# Patient Record
Sex: FEMALE | Race: WHITE | NOT HISPANIC OR LATINO | Employment: FULL TIME | URBAN - METROPOLITAN AREA
[De-identification: names, ages, dates, MRNs, and addresses within clinical notes are randomized per-mention and may not be internally consistent; named-entity substitution may affect disease eponyms.]

---

## 2018-12-17 ENCOUNTER — RECORDS - HEALTHEAST (OUTPATIENT)
Dept: LAB | Facility: HOSPITAL | Age: 28
End: 2018-12-17

## 2018-12-17 LAB
ANION GAP SERPL CALCULATED.3IONS-SCNC: 8 MMOL/L (ref 5–18)
BUN SERPL-MCNC: 14 MG/DL (ref 8–22)
CALCIUM SERPL-MCNC: 9.1 MG/DL (ref 8.5–10.5)
CHLORIDE BLD-SCNC: 112 MMOL/L (ref 98–107)
CK SERPL-CCNC: 35 U/L (ref 30–190)
CO2 SERPL-SCNC: 19 MMOL/L (ref 22–31)
CREAT SERPL-MCNC: 0.85 MG/DL (ref 0.6–1.1)
GFR SERPL CREATININE-BSD FRML MDRD: >60 ML/MIN/1.73M2
GLUCOSE BLD-MCNC: 83 MG/DL (ref 70–125)
POTASSIUM BLD-SCNC: 4.1 MMOL/L (ref 3.5–5)
SODIUM SERPL-SCNC: 139 MMOL/L (ref 136–145)

## 2018-12-20 ENCOUNTER — COMMUNICATION - HEALTHEAST (OUTPATIENT)
Dept: NEUROSURGERY | Facility: CLINIC | Age: 28
End: 2018-12-20

## 2019-01-09 ENCOUNTER — RECORDS - HEALTHEAST (OUTPATIENT)
Dept: ADMINISTRATIVE | Facility: OTHER | Age: 29
End: 2019-01-09

## 2019-01-10 ENCOUNTER — OFFICE VISIT - HEALTHEAST (OUTPATIENT)
Dept: NEUROSURGERY | Facility: CLINIC | Age: 29
End: 2019-01-10

## 2019-01-10 DIAGNOSIS — G93.5 CHIARI I MALFORMATION (H): ICD-10-CM

## 2019-01-10 RX ORDER — TOPIRAMATE 25 MG/1
75 TABLET, FILM COATED ORAL
Status: SHIPPED | COMMUNITY
Start: 2018-09-21

## 2019-01-10 RX ORDER — LORAZEPAM 0.5 MG/1
0.5 TABLET ORAL
Status: SHIPPED | COMMUNITY
Start: 2018-09-21

## 2019-01-10 ASSESSMENT — MIFFLIN-ST. JEOR: SCORE: 1614.46

## 2019-01-14 ENCOUNTER — RECORDS - HEALTHEAST (OUTPATIENT)
Dept: RADIOLOGY | Facility: CLINIC | Age: 29
End: 2019-01-14

## 2021-02-08 ENCOUNTER — OFFICE VISIT - HEALTHEAST (OUTPATIENT)
Dept: CARDIOLOGY | Facility: CLINIC | Age: 31
End: 2021-02-08

## 2021-02-08 DIAGNOSIS — Z00.6 EXAMINATION OF PARTICIPANT OR CONTROL IN CLINICAL RESEARCH: ICD-10-CM

## 2021-02-08 RX ORDER — NAPROXEN SODIUM 220 MG
220 TABLET ORAL PRN
Status: SHIPPED | COMMUNITY
Start: 2021-02-08

## 2021-02-08 RX ORDER — DULOXETIN HYDROCHLORIDE 60 MG/1
60 CAPSULE, DELAYED RELEASE ORAL DAILY
Status: SHIPPED | COMMUNITY
Start: 2021-02-08

## 2021-02-09 ENCOUNTER — AMBULATORY - HEALTHEAST (OUTPATIENT)
Dept: CARDIOLOGY | Facility: CLINIC | Age: 31
End: 2021-02-09

## 2021-02-09 DIAGNOSIS — Z00.6 EXAMINATION OF PARTICIPANT IN CLINICAL TRIAL: ICD-10-CM

## 2021-02-09 LAB — HCG UR QL: NEGATIVE

## 2021-02-09 ASSESSMENT — MIFFLIN-ST. JEOR: SCORE: 1659.82

## 2021-03-02 ENCOUNTER — AMBULATORY - HEALTHEAST (OUTPATIENT)
Dept: CARDIOLOGY | Facility: CLINIC | Age: 31
End: 2021-03-02

## 2021-03-02 DIAGNOSIS — Z00.6 EXAMINATION OF PARTICIPANT OR CONTROL IN CLINICAL RESEARCH: ICD-10-CM

## 2021-03-02 LAB — HCG UR QL: NEGATIVE

## 2021-03-16 ENCOUNTER — AMBULATORY - HEALTHEAST (OUTPATIENT)
Dept: CARDIOLOGY | Facility: CLINIC | Age: 31
End: 2021-03-16

## 2021-03-16 DIAGNOSIS — Z00.6 EXAMINATION OF PARTICIPANT IN CLINICAL TRIAL: ICD-10-CM

## 2021-05-27 VITALS
HEART RATE: 99 BPM | RESPIRATION RATE: 18 BRPM | OXYGEN SATURATION: 96 % | DIASTOLIC BLOOD PRESSURE: 77 MMHG | TEMPERATURE: 98.1 F | SYSTOLIC BLOOD PRESSURE: 116 MMHG

## 2021-05-27 VITALS
SYSTOLIC BLOOD PRESSURE: 129 MMHG | HEART RATE: 94 BPM | RESPIRATION RATE: 18 BRPM | TEMPERATURE: 97.8 F | OXYGEN SATURATION: 98 % | DIASTOLIC BLOOD PRESSURE: 74 MMHG

## 2021-06-02 VITALS — HEIGHT: 67 IN | WEIGHT: 190 LBS | BODY MASS INDEX: 29.82 KG/M2

## 2021-06-05 VITALS
HEIGHT: 67 IN | DIASTOLIC BLOOD PRESSURE: 67 MMHG | HEART RATE: 103 BPM | BODY MASS INDEX: 31.39 KG/M2 | TEMPERATURE: 97.9 F | RESPIRATION RATE: 14 BRPM | WEIGHT: 200 LBS | SYSTOLIC BLOOD PRESSURE: 112 MMHG | OXYGEN SATURATION: 97 %

## 2021-06-18 NOTE — PATIENT INSTRUCTIONS - HE
Patient Instructions by Sera Prater RN at 3/2/2021  8:00 AM     Author: Sera Prater RN Service: -- Author Type: Registered Nurse    Filed: 3/2/2021  8:32 AM Encounter Date: 3/2/2021 Status: Signed    : Sera Prater RN (Registered Nurse)           PREVENT-19 Day 21  Thank you for coming in today for your 2nd injection.  Please continue to add your temperature daily in the kenneth.  If you have symptoms, please wait to start the nasal swab collections until you have been contacted by the study team and instructed to do so.    Your next visit is Day 35. We have scheduled this for 3/16/2021 at 1030am.  If you have questions, you may call the [facility] Research department at [phone]    Best regards,  Sera Prater RN

## 2021-06-18 NOTE — PATIENT INSTRUCTIONS - HE
Patient Instructions by Shola Lewis at 2/9/2021  8:00 AM     Author: Shola Lewis Service: -- Author Type: Research Associate    Filed: 2/9/2021  9:18 AM Encounter Date: 2/9/2021 Status: Signed    : Shola Lewis (Research Associate)         Thank you for coming in today and agreeing to participate in the PREVENT-19 COVID-19 Vaccine Clinical Trial     Your next appointment is in 21 days. We have scheduled that for 3/2/21 at 8:00AM.  You are going home with the following items    A ruler to measure any injection site reaction that might happen. This includes areas of redness, swelling, or warmth    A kit for collection of nasal swabs for you to use as directed  o Instructions on how to collect the nasal swab    Participant Guidance Document    Folder with your copy of the consent form with copies of the signature pages    A thermometer and instructions for taking your temperature    [employer]in[employer] (payment debit card): As stated within the consent form, you will receive these funds within 30 days of your visit.  While we will work to have the funds available as soon as possible, you should expect it will take a minimum of 2 weeks.  If you do not have funds loaded by your Day 21 visit, please let us know.  If you have issues with the card, please call us at [phone].      Best regards,  Shola Lewis. Research Associate

## 2021-06-18 NOTE — LETTER
Letter by Nila Rivera MD at      Author: Nila Rivera MD Service: -- Author Type: --    Filed:  Encounter Date: 1/10/2019 Status: (Other)       Felipe Moise MD  [address] [facility] [address]                                  January 10, 2019    Patient: Miesha Logan   MR Number: [id number]   YOB: 1990   Date of Visit: 1/10/2019     Dear Dr. Suma MD:    Thank you for referring Miesha Logan to me for evaluation. Below are the relevant portions of my assessment and plan of care.    If you have questions, please do not hesitate to call me. I look forward to following Miesha along with you.    Sincerely,        Nila Rivera MD          CC  MARY Bailey Mary E, MD  1/10/2019  8:19 AM  Sign at close encounter  NEUROSURGERY CONSULTATION NOTE:    Assessment:    1. Chiari I malformation (H)         Plan:  I will get a CINE flow study and see her back.    Miesha Logan   [address] [facility] [address]  28 y.o. female is sent to me in consultation   by Mckayla Vargas NP     CC:    Chief Complaint   Patient presents with   ? Neck Pain       HPI:  Neurosurgery consultation was requested by: Dr. Felipe VARGAS since she was a young child   Pain: Neck pain   Radicular Pain is present: Will radiate into left shoulder but not always   Lhermitte sign: No  Motor complaints: Generally weak in both arms   Sensory complaints: Numbness in both pinkys and will go to 3rd finer on both hands.   Tingling in right foot twice. And right arm goes numb when sleeping   Gait and balance issues: Yes  Bowel or bladder issues: Denies   Duration of SX is: Chronic HA and neck pain since 11/2018  The symptoms are worse with: Laying down is worse for HA,   The symptoms are better with: Certain positions   Injury: Denies   Severity is:Chronic   Patient has tried the following conservative measures: None       PROBLEM LIST:  1. Chiari I malformation (H)           REVIEW OF SYSTEMS:  A 12 point review of system is  positive for some weight changes.  She does experience some blurred or double vision and ringing in the ears.  She has some difficulty hearing which prompted the MRI scan of the brain.  She does experience abdominal pain.  She can experience anxiety.  She has the neurological symptoms listed above.  She has some intolerance to hot and cold.  She also has some personal history of arthritis.  Otherwise, the review is negative.      Past Medical History:   Diagnosis Date   ? Anxiety    ? Asthma    ? Headache    ? Pyriformis syndrome          Past Surgical History:   Procedure Laterality Date   ? cyst removed from jaw           MEDICATIONS:  Current Outpatient Medications   Medication Sig Dispense Refill   ? levonorgestrel (KYLEENA) 17.5 mcg/24 hr (5 years) IUD IUD 1 Device by Intrauterine route.     ? LORazepam (ATIVAN) 0.5 MG tablet Take 0.5 mg by mouth.     ? sertraline (ZOLOFT) 50 MG tablet Take 50 mg by mouth.     ? topiramate (TOPAMAX) 25 MG tablet Take 75 mg by mouth.       No current facility-administered medications for this visit.          ALLERGIES/SENSITIVITIES:     Allergies   Allergen Reactions   ? Penicillins        PERTINENT SOCIAL HISTORY:   Social History     Socioeconomic History   ? Marital status: Single     Spouse name: None   ? Number of children: None   ? Years of education: None   ? Highest education level: None   Social Needs   ? Financial resource strain: None   ? Food insecurity - worry: None   ? Food insecurity - inability: None   ? Transportation needs - medical: None   ? Transportation needs - non-medical: None   Occupational History   ? None   Tobacco Use   ? Smoking status: Never Smoker   ? Smokeless tobacco: Never Used   Substance and Sexual Activity   ? Alcohol use: None   ? Drug use: None   ? Sexual activity: None       FAMILY HISTORY:  Family History   Problem Relation Age of Onset   ? Arthritis Mother    ? Thyroid disease Mother    ? Hyperlipidemia Mother    ? Arthritis Father    ?  "Depression Father    ? Hypertension Father    ? Depression Sister    ? Heart attack Maternal Grandfather    ? Dementia Maternal Grandfather    ? Heart attack Paternal Grandfather    ? Parkinsonism Paternal Grandfather         PHYSICAL EXAM:   Constitutional: /68   Pulse 99   Ht 5' 7\" (1.702 m)   Wt 190 lb (86.2 kg)   SpO2 98%   BMI 29.76 kg/m       General appearance: Appropriately groomed.  No acute distress.  Interactive.     Mental Status: Mental status: Alert and oriented, mood and affect appropriate, language reception and expression normal, recent and remote memory is normal, higher cortical function normal. Attention span, concentration and ability to follow commands is normal.       Cranial Nerves: Face is symmetric.  Extraocular movements are full, conjugate and without nystagmus.  Hearing is preserved.  Shoulder position is symmetric.  Tongue is midline with normal motion.  Palate elevates symmetrically.     Motor: Motor exam nl bilateral UE. Tone nl, bulk nl and strength 5/5 all groups.      Sensory: Sensory exam by subjective report intact to LT,PP,Position and Vib. in the UE and  LE, with the exception of some minor alteration in the pinky fingers on both sides.     Station and Gait:  Station and Gait- nl stride length,  balance and manoj.     Reflexes; supinator, biceps, triceps, knee/ ankle jerk intact.     IMAGING:  I have personally reviewed the images and discussed the findings with Miesha Logan.  She does have a Chiari malformation with pointed tonsils.  She has some bulging disks in her neck.    CC:     Felipe Moise MD[address] [place] [address]       Bisi Avalos  1/10/2019  7:59 AM  Signed  Neurosurgery consultation was requested by: Dr. Felipe Moise  VARGAS since she was a young child   Pain: Neck pain   Radicular Pain is present: Will radiate into left shoulder but not always   Lhermitte sign: No  Motor complaints: Generally weak in both arms   Sensory complaints: " Numbness in both pinkys and will go to 3rd finer on both hands.   Tingling in right foot twice. And right arm goes numb when sleeping   Gait and balance issues: Yes  Bowel or bladder issues: Denies   Duration of SX is: Chronic HA and neck pain since 11/2018  The symptoms are worse with: Laying down is worse for HA,   The symptoms are better with: Certain positions   Injury: Denies   Severity is:Chronic   Patient has tried the following conservative measures: None   NDI score is :    DWAIN Dyer

## 2021-06-18 NOTE — PATIENT INSTRUCTIONS - HE
Patient Instructions by Norah Otero RN at 3/16/2021 10:30 AM     Author: Norah Otero RN Service: -- Author Type: Registered Nurse    Filed: 3/16/2021 10:49 AM Encounter Date: 3/16/2021 Status: Signed    : Norah Otero RN (Registered Nurse)           Thank you for coming in today for the Day 35 visit.  Your next study visit is Month 3.   Reminder: this visit will be at the [place] [place] [facility]  The address for the [facility] is: [address] [place] [address]  The phone number is [phone]  We have provided you with a map for your reference too.    The study team from the [facility] will be in contact with you to schedule your next study visit.     The PREVENT-19 team at [facility] wishes you all the best and appreciates the opportunity to partner with you in this vaccine trial.  We wish you all the best in the future.    Norah Otero RN

## 2021-06-23 NOTE — PATIENT INSTRUCTIONS - HE
Dr. Rivera will order a MRI with Cine flow study, once completed we will have you come back to discuss results with Dr. Rivera and next step.

## 2021-06-23 NOTE — PROGRESS NOTES
NEUROSURGERY CONSULTATION NOTE:    Assessment:    1. Chiari I malformation (H)         Plan:  I will get a CINE flow study and see her back.    Miesha Logan   [address] [facility] [address]  28 y.o. female is sent to me in consultation   by Mckayla Vargas NP     CC:    Chief Complaint   Patient presents with     Neck Pain       HPI:  Neurosurgery consultation was requested by: Dr. Felipe VARGAS since she was a young child   Pain: Neck pain   Radicular Pain is present: Will radiate into left shoulder but not always   Lhermitte sign: No  Motor complaints: Generally weak in both arms   Sensory complaints: Numbness in both pinkys and will go to 3rd finer on both hands.   Tingling in right foot twice. And right arm goes numb when sleeping   Gait and balance issues: Yes  Bowel or bladder issues: Denies   Duration of SX is: Chronic HA and neck pain since 11/2018  The symptoms are worse with: Laying down is worse for HA,   The symptoms are better with: Certain positions   Injury: Denies   Severity is:Chronic   Patient has tried the following conservative measures: None       PROBLEM LIST:  1. Chiari I malformation (H)           REVIEW OF SYSTEMS:  A 12 point review of system is positive for some weight changes.  She does experience some blurred or double vision and ringing in the ears.  She has some difficulty hearing which prompted the MRI scan of the brain.  She does experience abdominal pain.  She can experience anxiety.  She has the neurological symptoms listed above.  She has some intolerance to hot and cold.  She also has some personal history of arthritis.  Otherwise, the review is negative.      Past Medical History:   Diagnosis Date     Anxiety      Asthma      Headache      Pyriformis syndrome          Past Surgical History:   Procedure Laterality Date     cyst removed from jaw           MEDICATIONS:  Current Outpatient Medications   Medication Sig Dispense Refill     levonorgestrel (KYLEENA) 17.5 mcg/24  "hr (5 years) IUD IUD 1 Device by Intrauterine route.       LORazepam (ATIVAN) 0.5 MG tablet Take 0.5 mg by mouth.       sertraline (ZOLOFT) 50 MG tablet Take 50 mg by mouth.       topiramate (TOPAMAX) 25 MG tablet Take 75 mg by mouth.       No current facility-administered medications for this visit.          ALLERGIES/SENSITIVITIES:     Allergies   Allergen Reactions     Penicillins        PERTINENT SOCIAL HISTORY:   Social History     Socioeconomic History     Marital status: Single     Spouse name: None     Number of children: None     Years of education: None     Highest education level: None   Social Needs     Financial resource strain: None     Food insecurity - worry: None     Food insecurity - inability: None     Transportation needs - medical: None     Transportation needs - non-medical: None   Occupational History     None   Tobacco Use     Smoking status: Never Smoker     Smokeless tobacco: Never Used   Substance and Sexual Activity     Alcohol use: None     Drug use: None     Sexual activity: None       FAMILY HISTORY:  Family History   Problem Relation Age of Onset     Arthritis Mother      Thyroid disease Mother      Hyperlipidemia Mother      Arthritis Father      Depression Father      Hypertension Father      Depression Sister      Heart attack Maternal Grandfather      Dementia Maternal Grandfather      Heart attack Paternal Grandfather      Parkinsonism Paternal Grandfather         PHYSICAL EXAM:   Constitutional: /68   Pulse 99   Ht 5' 7\" (1.702 m)   Wt 190 lb (86.2 kg)   SpO2 98%   BMI 29.76 kg/m      General appearance: Appropriately groomed.  No acute distress.  Interactive.     Mental Status: Mental status: Alert and oriented, mood and affect appropriate, language reception and expression normal, recent and remote memory is normal, higher cortical function normal. Attention span, concentration and ability to follow commands is normal.       Cranial Nerves: Face is symmetric.  " Extraocular movements are full, conjugate and without nystagmus.  Hearing is preserved.  Shoulder position is symmetric.  Tongue is midline with normal motion.  Palate elevates symmetrically.     Motor: Motor exam nl bilateral UE. Tone nl, bulk nl and strength 5/5 all groups.      Sensory: Sensory exam by subjective report intact to LT,PP,Position and Vib. in the UE and  LE, with the exception of some minor alteration in the pinky fingers on both sides.     Station and Gait:  Station and Gait- nl stride length,  balance and manoj.     Reflexes; supinator, biceps, triceps, knee/ ankle jerk intact.     IMAGING:  I have personally reviewed the images and discussed the findings with Miesha Logan.  She does have a Chiari malformation with pointed tonsils.  She has some bulging disks in her neck.    CC:     Felipe Moise MD[address] [facility] [place] [address]

## 2021-06-23 NOTE — PROGRESS NOTES
Neurosurgery consultation was requested by: Dr. Felipe VARGAS since she was a young child   Pain: Neck pain   Radicular Pain is present: Will radiate into left shoulder but not always   Lhermitte sign: No  Motor complaints: Generally weak in both arms   Sensory complaints: Numbness in both pinkys and will go to 3rd finer on both hands.   Tingling in right foot twice. And right arm goes numb when sleeping   Gait and balance issues: Yes  Bowel or bladder issues: Denies   Duration of SX is: Chronic HA and neck pain since 11/2018  The symptoms are worse with: Laying down is worse for HA,   The symptoms are better with: Certain positions   Injury: Denies   Severity is:Chronic   Patient has tried the following conservative measures: None   NDI score is :    DWAIN Dyer

## 2021-06-30 NOTE — PROGRESS NOTES
Progress Notes by rTinity Perez PA-C at 3/2/2021  8:00 AM     Author: Trinity Perez PA-C Service: -- Author Type: Physician Assistant    Filed: 3/2/2021  8:43 AM Encounter Date: 3/2/2021 Status: Signed    : Trinity Perez PA-C (Physician Assistant)             What was the body system examined: Defaulted value based on protocol requirements and will not require .   System  Normal/Abnormal  If abnormal, CS?   If abnormal, describe    Skin  Normal  Not applicable     HEENT Normal  Not applicable     Neck  Normal  Not applicable     Thyroid  Normal  Not applicable      Lungs  Normal  Not applicable     Heart  Normal  Not applicable     Abdomen  Normal  Not applicable     Lymph Nodes  Normal  Not applicable     Musculoskeletal/Extremities  Normal  Not applicable     Other  Normal  Not applicable        What was the overall interpretation: Please select from the dropdown list.  Normal    ASTER Barnes PA-C

## 2021-06-30 NOTE — PROGRESS NOTES
Progress Notes by Sera Prater RN at 3/2/2021  8:00 AM     Author: Sera Prater RN Service: -- Author Type: Registered Nurse    Filed: 3/2/2021 10:38 AM Encounter Date: 3/2/2021 Status: Signed    : Sera Prater RN (Registered Nurse)           Visit 2  3/2/2021  In person, scheduled  Time seated: 0803am  In the last 2 weeks, did the participant attend any large gatherings (> 10 people), or come in close contact (<6 feet) with a person known to  have COVID-19, returned to school or to work in-person?  [x] Yes   [] No    Reviewed Inclusion/Exclusion criteria--please refer to that note for details and for co-sign by Dr. Mccloud.   Participants meeting the following criterion may be delayed for subsequent vaccination:     Respiratory symptoms in the past 3 days (ie, body temperature of > 38.0 C, cough, sore throat, difficulty breathing).   o Participant may be vaccinated when all symptoms have been resolved for > 3 days.   o Out of window vaccination is allowed for this reason.     Ongoing consent process: review schedule of events for today, and upcoming appointments; provided her with an update on known overall study progress; reviewed eDiary expectations; reiterated expected AEs (especially associated with injection site).  she has no further questions or concerns at this time.     Adverse Event/Serious Adverse Event: asked participant if any AE/SAEs occurred since last contact (2/9/2021)    she denies AE/SAEs        Physical Exam   Please refer to Trinity RODARTE 3/2/2021 note for PE details  VS-T,P, R, BP  Visit Vitals @ 815am    /74 (Patient Site: Left Arm, Patient Position: Sitting, Cuff Size: Adult Regular)   Pulse 94   Temp 97.8  F (36.6  C) (Oral)   Resp 18   SpO2 98%         Lab Results   Component Value Date    PREGTESTUR Negative 03/02/2021      Date/time spec collected:  3/2/2021@ 825 am    Study labs SARS-CoV-2 vaccine immunogenicity (IgG antibody to SARS-CoV-2 S protein, [place], hACE2  inhibition) drawn   [x] Yes   Time 822am  [] No, why?   Requisition number:  [id number]    Administrations This Visit     Study SARS-CoV-2 vaccine vs. placebo (IDS# [id number]) injection injection 0.5 mL     Admin Date  03/02/2021 @ 920 am Action  Given Dose  0.5 mL Route  Intramuscular, right deltoid Administered By  Sera Prater RN                  Reviewed eDiary with participant.  Study team reviewed completion instructions with participant and questions answered to her satisfaction.  Participant waited in Clinical Research Unit (CRU) for @ least 30 minutes after receiving injection.  Participant experienced no adverse symptoms prior to leaving CRU  Discharge time: 950 am    Plan: Next study visit (Visit 3) scheduled on 3/16/2021 at 1030 am    Sera Prater RN

## 2021-06-30 NOTE — PROGRESS NOTES
"Progress Notes by Yenny Dawn RN at 2/8/2021  8:00 AM     Author: Yenny Danw RN Service: -- Author Type: Registered Nurse    Filed: 2/8/2021  8:38 AM Encounter Date: 2/8/2021 Status: Signed    : Yenny Dawn RN (Registered Nurse)     Summary: Novavax vaccine study          Study name PREVENT-19  Protocol Title:  A Phase 3, Randomized, Observer-Blinded, Placebo-Controlled Study to Evaluate the Efficacy, Safety, and Immunogenicity of a SARS-CoV-2 Recombinant Alden Protein Nanoparticle Vaccine (SARS-CoV-2 rS) with Matrix-M1? Adjuvant in Adult Participants ? 18 Years     Research Yenny Dawn RN nurse associate spoke with Miesha Logan and/or their family by phone to discuss participation in the Novavax vaccine study.    The study discussion continued with an introduction of the study purpose and the qualifications for participation.     The consent discussion included the following:    Description of trial    Number of Participants    Risks and Discomforts    Unforeseeable Risks    Benefits    Alternative Procedures or Treatments    Confidentiality    Compensation and Medical Treatments in Event of Injury    Contacts    Voluntary Participation    Involuntary Termination of Participant's Participation    Additional Costs to Participant    Consequences of Participant's Decision to Withdraw    Providing Significant New Findings to Participants      Miesha Logan was provided time to consider participation and ask questions.  All questions answered to her satisfaction.  Miesha Logan was queried regarding their understanding of the trial. she was able to correctly answer the following questions:    Double blind placebo control    Follow up visits    Need to report side effects and/or possible COVID-19 symptoms       Miesha Logan has preliminarily agreed to participate in the \"PREVENT-19\" COVID-19 vaccine clinical trial.  she will sign consent form in person on 02- after in person continuation of consent " process. This visit is scheduled for 0800.    Yenny Dawn RN    A review of medical history and medications was done to screen for items that may impact ability to participate in the trial.     Most recent Influenza vaccine:     Must be >4 days prior to Novavax vaccine  Any other vaccines (name & dates):  None   Must be >7 days prior to Novavax vaccine    Medical History  Need: Start date (year @ minimum), end date (year @ minimum), or ongoing  Past Medical History:   Diagnosis Date   ? Anxiety    ? Asthma    ? Headache    ? Pyriformis syndrome        Hospitalized in last 6 months? [] Yes   [x] No    Woman of Childbearing Potential- delete if not applicable  What is the subject's child bearing potential? [] Sterile [] Post-Menopausal  [x] Potentially Able To Bear Children    If of Childbearing Potential, please confirm if subject is receiving a medically accepted form of birth control [x] Yes   [] No   Inform her that urine pregnancy test will be performed on day of visit      Yenny Dawn RN

## 2021-06-30 NOTE — PROGRESS NOTES
"Progress Notes by Shola Lewis at 2/9/2021  8:00 AM     Author: Shola Lewis Service: -- Author Type: Research Associate    Filed: 2/9/2021 12:47 PM Encounter Date: 2/9/2021 Status: Signed    : Lizzie Mccloud MD (Physician)    Related Notes: Original Note by Shola Lewis (Research Associate) filed at 2/9/2021 12:06 PM           Study Name: PREVENT-19  : Janae Leavitt MD   Sub Investigator: Jacobo Mccloud MD    Protocol version: 3.0, 16 NOV 2020    Inclusion Criteria  Criteria #   Inclusion Criteria (all must be yes)    1  Adults ? 18 years of age at screening who, by virtue of age, race, ethnicity or life circumstances, are considered at substantial risk of exposure to and infection with SARS-CoV-2   Yes   2  Willing and able to give informed consent prior to study enrollment and to comply with study procedu   Yes   3  Participants of childbearing potential (defined as any participant who has experienced menarche and who is NOT surgically sterile [ie, hysterectomy, bilateral tubal ligation, or bilateral oophorectomy] or postmenopausal [defined as amenorrhea at least 12 consecutive months]) must agree to be heterosexually inactive from at least 28 days prior to enrollment and through 3 months after the last vaccination OR agree to consistently use a medically acceptable method of contraception from at least 28 days prior to enrollment and through 3 months after the last vaccination   Yes   4  Is medically stable, as determined by the investigator (based on review of health status, vital signs (TPRBP) medical history, & targeted physical examination (weight)  VS must be within medically acceptable ranges prior to the first vaccination.   Yes   5  Agree to not participate in any other SARS-CoV-2 prevention trial during the study follow-up.   Yes     Exclusion Criteria  Criteria #  -all must be \"no\"    1  Unstable acute or chronic illness. Criteria for unstable medical conditions include "   a. Substantive changes in chronic prescribed medication (change in class or significant change in dose) in the past 2 months  b. Currently undergoing workup of undiagnosed illness that could lead to diagnosis of a new condition   Note: Well-controlled human immunodeficiency virus [HIV] with undetectable HIV RNA and CD4 count > 200 cells/?L for at least 1 year, documented within the last 6 months, is NOT considered an unstable chronic illness.    No   2  Participation in research involving an investigational product (drug/biologic/device) within 45 days prior to first study vaccination   No   3  History of a previous laboratory-confirmed diagnosis of SARS-CoV-2 infection or COVID-19   No   4  Received influenza vaccination or any other adult vaccine within 4 days prior to or within 7 days after either study vaccination   No   5  Autoimmune or immunodeficiency disease/condition (iatrogenic or congenital) requiring ongoing immunomodulatory therapy NOTE: Stable endocrine disorders (eg, thyroiditis, pancreatitis), including stable diabetes mellitus with no history of diabetic ketoacidosis) are NOT excluded   No   6  Chronic administration (defined as > 14 continuous days) of immunosuppressant, systemic glucocorticoids, or other immune-modifying drugs within 90 days prior to first study vaccination. NOTE: An immunosuppressant dose of glucocorticoid is defined as a systemic dose ? 20 mg of prednisone per day or equivalent. The use of topical, inhaled, and nasal glucocorticoids is permitted. Topical tacrolimus and ocular cyclosporin are permitted   No   7  Received immunoglobulin, blood-derived products, or immunosuppressant drugs within 90 days prior to first study vaccination   No   8  Active cancer (malignancy) on therapy within 1 year prior to first study vaccination (with the exception of malignancy cured via excision, at the discretion of the investigator)   No   9  Any known allergies to products contained in the  investigational product.   No   10  Participants who are breastfeeding, pregnant or who plan to become pregnant within 3 months following last study vaccination   No   11  Any other condition that, in the opinion of the investigator, would pose a health risk to the participant if enrolled or could interfere with evaluation of the trial vaccine or interpretation of study results.   No   12  Study team member or first-degree relative of any study team member (inclusive of Sponsor, and study site personnel involved in the study)   No   13  Current participation in any other COVID-19 prevention clinical trial.   No       Subject has met all inclusion criteria and no exclusion criteria have been met.   Subject is ready to fully enrolled in the PREVENT-19 study.    Shola Lewis

## 2021-06-30 NOTE — PROGRESS NOTES
"Progress Notes by Shola Lewis at 2/9/2021  8:00 AM     Author: Shola Lewis Service: -- Author Type: Research Associate    Filed: 3/2/2021  2:27 PM Encounter Date: 2/9/2021 Status: Addendum    : Shola Lewis (Research Associate)    Related Notes: Original Note by Shola Lewis (Research Associate) filed at 3/2/2021  2:13 PM           Visits Screening/Baseline (1)    Time seated: 8:05 AM    The study discussion continued with an introduction of the study purpose and the qualifications for participation.     The consent discussion included the following:    Description of trial    Number of Participants    Risks and Discomforts    Unforeseeable Risks    Benefits    Alternative Procedures or Treatments    Confidentiality    Compensation and Medical Treatments in Event of Injury    Contacts    Voluntary Participation    Involuntary Termination of Participant's Participation    Additional Costs to Participant    Consequences of Participant's Decision to Withdraw    Providing Significant New Findings to Participants      Miesha Logan was provided time to consider participation and ask questions.  All questions answered to her satisfaction.  Miesha Logan was queried regarding their understanding of the trial. she was able to correctly answer the following questions:    Double blind placebo control    Follow up visits    Need to report side effects and/or possible COVID-19 symptoms       Miesha Logan has agreed to participate in the \"PREVENT-19\" COVID-19 vaccine clinical trial.  Consent form signed (version 3, IRB approved Nov 25, 2020), copies of consent signatures provided to participant.  No study procedures performed prior to obtaining consent to participate.       In person, scheduled  In the last 2 weeks, did the participant attend any large gatherings (> 10 people), or come in close contact (<6 feet) with a person known to have COVID-19, returned to school or to work in-person?  [x] Yes   [] " No    Reviewed Inclusion/Exclusion criteria--please refer to that note for details and for co-sign by Dr. Mccloud/MARLON.    1990   female  Ethnicity   []  or    [x] Not  or   Race   []  or    []    [] Black or   []  or other    [x] White    Occupation   Attending school in person   [] Yes   [x] No   Currently working    [x] Yes   [] No    If yes: Required to be in close proximity (<6 ft) to other people?   [x] Yes   [] No   How often is participant required to be present in workplace (I.e., not work from home [[employer]])    [] 0 days/week  [] 1 day/week  [] 2-4 days/week  [x] >5 days/week  Do people in participant's main workplace use PPE? [x] Yes   [] No    Living Situation  How many people live with the subject (other than subject)? 0  Total people under 18 years of age 0  Total people between 18-64 years of age 0  Total people 65 years of age or older  0    How many people that you live with, currently attend school or  facilities outside of the home? 0  If more than 1 person reported in question above,   Does anyone that the subject lives with, work in a job that requires in close proximity (less than 6 Unknown  feet) to other people, such as the jobs listed earlier [] Yes   [x] No    Lifestyle  Does the subject have a history of smoking or vaping? [] Yes   [x] No  Is the subject currently smoking or vaping?   [] Yes   [x] No    Has the subject experienced any past and/or concomitant medical conditions or significant surgical procedures? [] Yes   [x] No  If yes, please see medical history below.    Randomization  Date of randomization: 2/9/21  Randomization number: [id number]  Age group  [x] 18-64 years  [] > 65 years    Physical Exam   Please refer to Trinity Perez) note for PE details    Visit Vitals  /67 (Patient Site: Left Arm, Patient Position: Sitting, Cuff Size: Adult Large)  "  Pulse (!) 103 (NCS)   Temp 97.9  F (36.6  C) (Oral)   Resp 14   Ht 5' 7\" (1.702 m)   Wt 200 lb (90.7 kg)   SpO2 97%   BMI 31.32 kg/m      Vitals:    02/09/21 0814   Weight: 200 lb (90.7 kg)          For woman of child bearing potential (delete if does not apply)  Confirm if participant is using a medically accepted form of birth control Yes  Lab Results   Component Value Date    PREGTESTUR Negative 03/02/2021      Date/time spec collected 8:27 AM    Study labs (Immunogenicity, SARS-CoV-2 (anti-NP))  drawn   [x] Yes   Time 8:49 AM [] No, why?    Requisition Number: [id number]    Nasal swab collection:  [x] Yes   Time 8:53 AM [] No, why?        Administrations This Visit     Study SARS-CoV-2 vaccine vs. placebo (IDS# [id number]) injection injection 0.5 mL     Admin Date  02/09/2021  @ 10:15 AM Action  Given Dose  0.5 mL Route  Intramuscular Administered By  Aida Allen RN                  Participant waited in Clinical Research Unit ([facility]) for @ least 30 minutes after receiving injection.  Participant experienced no adverse symptoms prior to leaving [facility]  Study team reviewed completion instructions with participant  Participant provided with eDiary to record all subsequent AEs and COVID-19 symptomatology.  Participant provided with nasal swab home collection kit and instructions on completion (refer to patient packet for complete details)  Participant provided with thermometer and reviewed instructions on how to take oral temperature.  Participant provided with a ruler to measure any site reactions that occur; instructed on how to use.  Participant provided with After Visit Summary that includes these instructions and next appointments.  she confirms that all questions have been answered to her satisfaction.    Plan: Next study visit (Visit 2) scheduled Visit date not found      Discharge time 10:47 AM    Shola Lewis    Current Outpatient Medications   Medication Sig Note   ? cholecalciferol, vitamin D3, 1,000 unit (25 " mcg) tablet  Start date: 2/8/21   Take 5,000 Units by mouth daily. Supplement   ? DULoxetine (CYMBALTA) 60 MG capsule  Start date: 2/8/21   Take 60 mg by mouth daily. Anxiety   ? levonorgestrel (KYLEENA) 17.5 mcg/24 hr (5 years) IUD IUD  Start date: 9/27/18   1 Device by Intrauterine route. IUD   ? LORazepam (ATIVAN) 0.5 MG tablet  Start date: 9/21/18   Take 0.5 mg by mouth. Anxiety   ? naproxen sodium (ALEVE) 220 MG tablet  Start date: 2/8/21   Take 220 mg by mouth as needed for pain. Migrane   ? topiramate (TOPAMAX) 25 MG tablet  Start date: 9/21/18   Take 75 mg by mouth. Migrandes   ? sertraline (ZOLOFT) 50 MG tablet  Start date: 12/27/18   Take 50 mg by mouth. Anxiety  2/8/2021: Severe reaction       Past Medical History:   Diagnosis Date   ? Anxiety 2010   ? Asthma 1992    not currently being treated   ? Chiari malformation type I (H) 2018    probably from birth   ? Headache 1994   ? Pyriformis syndrome 2008   ? Chiari Malformation Type 1 2018

## 2021-06-30 NOTE — PROGRESS NOTES
Progress Notes by Trinity Perez PA-C at 3/16/2021 10:30 AM     Author: Trinity Perez PA-C Service: -- Author Type: Physician Assistant    Filed: 3/16/2021 11:17 AM Encounter Date: 3/16/2021 Status: Signed    : Trinity Perez PA-C (Physician Assistant)             What was the body system examined: Defaulted value based on protocol requirements and will not require .   System  Normal/Abnormal  If abnormal, CS?   If abnormal, describe    Skin  Normal  Not applicable     HEENT Normal  Not applicable     Neck  Normal  Not applicable     Thyroid  Normal  Not applicable      Lungs  Normal  Not applicable     Heart  Normal  Not applicable     Abdomen  Normal  Not applicable     Lymph Nodes  Normal  Not applicable     Musculoskeletal/Extremities  Normal  Not applicable     Other  Normal  Not applicable        What was the overall interpretation: Please select from the dropdown list.  Normal    ASTER Barnes PA-C

## 2021-06-30 NOTE — PROGRESS NOTES
Progress Notes by Norah Otero RN at 3/16/2021 10:30 AM     Author: Norah Otero RN Service: -- Author Type: Registered Nurse    Filed: 3/16/2021 11:12 AM Encounter Date: 3/16/2021 Status: Signed    : Norah Otero RN (Registered Nurse)           Visit 3  3/16/21  In person, scheduled  In the last 2 weeks, did the participant attend any large gatherings (> 10 people), or come in close contact (<6 feet) with a person known to  have COVID-19, returned to school or to work in-person?  [] Yes   [x] No    Ongoing consent process: review schedule of events for today, and upcoming appointments; provided her with an update on known overall study progress; reviewed eDiary expectations; reiterated expected AEs (especially associated with injection site).  she has no further questions or concerns at this time.     Adverse Event/Serious Adverse Event: asked participant if any AE/SAEs occurred since last contact (3/2/21 date of last contact)   she denies AE/SAEs       Physical Exam   Please refer to PA note for PE details  VS-T,P, R, BP, wt, ht  Visit Vitals  /77 (Patient Site: Left Arm, Patient Position: Sitting, Cuff Size: Adult Large)   Pulse 99   Temp 98.1  F (36.7  C) (Oral)   Resp 18   SpO2 96%         Study labs ([facility], Immunogenicity, SARS-CoV-2 (anti-NP))  drawn   [x] Yes   Time 1105 [] No, why?    Requisition number: [id number]    Reviewed eDiary with participant.  Study team reviewed completion instructions with participant and questions answered to her satisfaction.      Plan: Next study visit (Visit 4) scheduled by the [facility]   NOTE: remind participant that this next visit will occur @ [facility] (noted on AVS)      Norah Otero RN

## 2021-06-30 NOTE — PROGRESS NOTES
Progress Notes by Trinity Perez PA-C at 2/9/2021  8:00 AM     Author: Trinity Perez PA-C Service: -- Author Type: Physician Assistant    Filed: 2/9/2021  9:14 AM Encounter Date: 2/9/2021 Status: Signed    : Trinity Perez PA-C (Physician Assistant)             What was the body system examined: Defaulted value based on protocol requirements and will not require .   System  Normal/Abnormal  If abnormal, CS?   If abnormal, describe    Skin  Normal  Not applicable     HEENT Normal  Not applicable     Neck  Normal  Not applicable     Thyroid  Normal  Not applicable      Lungs  Normal  Not applicable     Heart  Normal  Not applicable     Abdomen  Normal  Not applicable     Lymph Nodes  Normal  Not applicable     Musculoskeletal/Extremities  Normal  Not applicable     Other  Normal  Not applicable        What was the overall interpretation: Please select from the dropdown list.  Normal    ASTER Barnes PA-C

## 2022-06-10 ENCOUNTER — HOSPITAL ENCOUNTER (OUTPATIENT)
Dept: RESEARCH | Facility: CLINIC | Age: 32
Discharge: HOME | End: 2022-06-10
Attending: INTERNAL MEDICINE

## 2022-06-10 PROCEDURE — 510N000009 HC RESEARCH FACILITY, PER 15 MIN

## 2022-06-10 PROCEDURE — 510N000017 HC CRU PATIENT CARE, PER 15 MIN

## 2022-10-10 ENCOUNTER — HOSPITAL ENCOUNTER (OUTPATIENT)
Dept: RESEARCH | Facility: CLINIC | Age: 32
Discharge: HOME | End: 2022-10-10
Attending: INTERNAL MEDICINE

## 2022-10-10 PROCEDURE — 300N000004 HC RESEARCH SPECIMEN PROCESSING, MODERATE

## 2022-10-10 PROCEDURE — 510N000009 HC RESEARCH FACILITY, PER 15 MIN

## 2022-10-10 PROCEDURE — 510N000017 HC CRU PATIENT CARE, PER 15 MIN

## 2023-02-15 ENCOUNTER — HOSPITAL ENCOUNTER (OUTPATIENT)
Dept: RESEARCH | Facility: CLINIC | Age: 33
Discharge: HOME | End: 2023-02-15
Attending: INTERNAL MEDICINE

## 2023-02-15 PROCEDURE — 510N000017 HC CRU PATIENT CARE, PER 15 MIN

## 2023-02-15 PROCEDURE — 510N000009 HC RESEARCH FACILITY, PER 15 MIN
